# Patient Record
Sex: MALE | Race: WHITE | URBAN - NONMETROPOLITAN AREA
[De-identification: names, ages, dates, MRNs, and addresses within clinical notes are randomized per-mention and may not be internally consistent; named-entity substitution may affect disease eponyms.]

---

## 2017-05-10 ENCOUNTER — DOCTOR'S OFFICE (OUTPATIENT)
Dept: URBAN - NONMETROPOLITAN AREA CLINIC 1 | Facility: CLINIC | Age: 52
Setting detail: OPHTHALMOLOGY
End: 2017-05-10

## 2017-05-10 ENCOUNTER — DOCTOR'S OFFICE (OUTPATIENT)
Dept: URBAN - NONMETROPOLITAN AREA CLINIC 1 | Facility: CLINIC | Age: 52
Setting detail: OPHTHALMOLOGY
End: 2017-05-10
Payer: COMMERCIAL

## 2017-05-10 DIAGNOSIS — H01.001: ICD-10-CM

## 2017-05-10 DIAGNOSIS — H01.005: ICD-10-CM

## 2017-05-10 DIAGNOSIS — H52.03: ICD-10-CM

## 2017-05-10 DIAGNOSIS — H52.4: ICD-10-CM

## 2017-05-10 DIAGNOSIS — H01.004: ICD-10-CM

## 2017-05-10 DIAGNOSIS — H01.002: ICD-10-CM

## 2017-05-10 PROBLEM — H00.1: Status: RESOLVED | Noted: 2017-05-10 | Resolved: 2017-05-10

## 2017-05-10 PROCEDURE — 92015 DETERMINE REFRACTIVE STATE: CPT | Performed by: OPTOMETRIST

## 2017-05-10 PROCEDURE — 92014 COMPRE OPH EXAM EST PT 1/>: CPT | Performed by: OPTOMETRIST

## 2017-05-10 PROCEDURE — AVENOVA FA AVENOVA FACE CLEANSER: Performed by: OPHTHALMOLOGY

## 2017-05-10 ASSESSMENT — REFRACTION_CURRENTRX
OD_OVR_VA: 20/
OS_OVR_VA: 20/

## 2017-05-10 ASSESSMENT — REFRACTION_MANIFEST
OD_VA2: 20/
OS_VA2: 20/
OD_VA2: 20/
OS_VA1: 20/
OU_VA: 20/
OD_VA3: 20/
OD_VA3: 20/
OS_VA2: 20/
OS_VA1: 20/
OU_VA: 20/
OD_VA1: 20/
OS_VA3: 20/
OD_VA1: 20/
OS_VA3: 20/

## 2017-05-10 ASSESSMENT — REFRACTION_AUTOREFRACTION
OS_AXIS: 68
OD_CYLINDER: 0.00
OS_SPHERE: +1.00
OS_CYLINDER: -0.25
OD_SPHERE: +0.50

## 2017-05-10 ASSESSMENT — REFRACTION_OUTSIDERX
OS_ADD: +2.00
OD_AXIS: 135
OS_VA1: 20/20
OS_CYLINDER: -0.25
OD_SPHERE: +0.50
OD_CYLINDER: -0.75
OD_VA3: 20/
OS_VA2: 20/20
OD_VA2: 20/20
OS_AXIS: 075
OU_VA: 20/
OD_VA1: 20/20
OS_VA3: 20/
OD_ADD: +2.00
OS_SPHERE: +0.50

## 2017-05-10 ASSESSMENT — SPHEQUIV_DERIVED
OD_SPHEQUIV: 0.5
OS_SPHEQUIV: 0.875

## 2017-05-10 ASSESSMENT — VISUAL ACUITY
OS_BCVA: 20/20-1
OD_BCVA: 20/20-1

## 2017-05-10 ASSESSMENT — CONFRONTATIONAL VISUAL FIELD TEST (CVF)
OD_FINDINGS: FULL
OS_FINDINGS: FULL

## 2017-05-10 ASSESSMENT — LID EXAM ASSESSMENTS
OS_BLEPHARITIS: LLL LUL 1+ 2+
OD_BLEPHARITIS: RLL RUL 1+ 2+

## 2017-08-04 ENCOUNTER — OPTICAL OFFICE (OUTPATIENT)
Dept: URBAN - NONMETROPOLITAN AREA CLINIC 4 | Facility: CLINIC | Age: 52
Setting detail: OPHTHALMOLOGY
End: 2017-08-04
Payer: COMMERCIAL

## 2017-08-04 DIAGNOSIS — H52.223: ICD-10-CM

## 2017-08-04 PROCEDURE — V2781 PROGRESSIVE LENS PER LENS: HCPCS | Performed by: OPTOMETRIST

## 2017-08-04 PROCEDURE — V2020 VISION SVCS FRAMES PURCHASES: HCPCS | Performed by: OPTOMETRIST

## 2017-08-04 PROCEDURE — V2025 EYEGLASSES DELUX FRAMES: HCPCS | Performed by: OPTOMETRIST

## 2017-08-04 PROCEDURE — V2750 ANTI-REFLECTIVE COATING: HCPCS | Performed by: OPTOMETRIST

## 2017-08-04 PROCEDURE — V2203 LENS SPHCYL BIFOCAL 4.00D/.1: HCPCS | Performed by: OPTOMETRIST

## 2017-10-20 ENCOUNTER — RX ONLY (RX ONLY)
Age: 52
End: 2017-10-20

## 2017-10-20 ENCOUNTER — DOCTOR'S OFFICE (OUTPATIENT)
Dept: URBAN - NONMETROPOLITAN AREA CLINIC 1 | Facility: CLINIC | Age: 52
Setting detail: OPHTHALMOLOGY
End: 2017-10-20
Payer: MEDICARE

## 2017-10-20 ENCOUNTER — DOCTOR'S OFFICE (OUTPATIENT)
Dept: URBAN - NONMETROPOLITAN AREA CLINIC 1 | Facility: CLINIC | Age: 52
Setting detail: OPHTHALMOLOGY
End: 2017-10-20

## 2017-10-20 DIAGNOSIS — H01.004: ICD-10-CM

## 2017-10-20 DIAGNOSIS — H01.005: ICD-10-CM

## 2017-10-20 DIAGNOSIS — H01.002: ICD-10-CM

## 2017-10-20 DIAGNOSIS — H01.001: ICD-10-CM

## 2017-10-20 DIAGNOSIS — H52.4: ICD-10-CM

## 2017-10-20 PROCEDURE — AVENOVA FA AVENOVA FACE CLEANSER: Performed by: OPTOMETRIST

## 2017-10-20 PROCEDURE — 92012 INTRM OPH EXAM EST PATIENT: CPT | Performed by: OPTOMETRIST

## 2017-10-20 ASSESSMENT — CONFRONTATIONAL VISUAL FIELD TEST (CVF)
OD_FINDINGS: FULL
OS_FINDINGS: FULL

## 2017-10-20 ASSESSMENT — REFRACTION_OUTSIDERX
OS_SPHERE: +0.50
OS_VA2: 20/20
OD_VA2: 20/20
OS_VA1: 20/20
OD_VA1: 20/20
OS_AXIS: 075
OD_VA3: 20/
OD_AXIS: 135
OD_ADD: +2.00
OS_VA3: 20/
OS_CYLINDER: -0.25
OD_SPHERE: +0.50
OS_ADD: +2.00
OD_CYLINDER: -0.75
OU_VA: 20/

## 2017-10-20 ASSESSMENT — REFRACTION_MANIFEST
OD_VA1: 20/
OU_VA: 20/
OS_VA3: 20/
OD_VA1: 20/
OU_VA: 20/
OS_VA3: 20/
OD_VA2: 20/
OD_VA3: 20/
OS_VA2: 20/
OS_VA2: 20/
OD_VA2: 20/
OS_VA1: 20/
OS_VA1: 20/
OD_VA3: 20/

## 2017-10-20 ASSESSMENT — SPHEQUIV_DERIVED
OS_SPHEQUIV: 0.875
OD_SPHEQUIV: 0.5

## 2017-10-20 ASSESSMENT — REFRACTION_AUTOREFRACTION
OS_AXIS: 68
OD_CYLINDER: 0.00
OS_SPHERE: +1.00
OD_SPHERE: +0.50
OS_CYLINDER: -0.25

## 2017-10-20 ASSESSMENT — REFRACTION_CURRENTRX
OS_OVR_VA: 20/
OD_OVR_VA: 20/
OS_OVR_VA: 20/
OS_OVR_VA: 20/
OD_OVR_VA: 20/
OD_OVR_VA: 20/

## 2017-10-20 ASSESSMENT — VISUAL ACUITY
OS_BCVA: 20/20-1
OD_BCVA: 20/20-2

## 2017-10-20 ASSESSMENT — LID EXAM ASSESSMENTS
OD_BLEPHARITIS: RLL RUL 1+ 2+
OS_BLEPHARITIS: LLL LUL 1+ 2+

## 2019-01-18 ENCOUNTER — DOCTOR'S OFFICE (OUTPATIENT)
Dept: URBAN - NONMETROPOLITAN AREA CLINIC 1 | Facility: CLINIC | Age: 54
Setting detail: OPHTHALMOLOGY
End: 2019-01-18
Payer: MEDICARE

## 2019-01-18 DIAGNOSIS — H01.001: ICD-10-CM

## 2019-01-18 DIAGNOSIS — L03.213: ICD-10-CM

## 2019-01-18 DIAGNOSIS — H01.005: ICD-10-CM

## 2019-01-18 DIAGNOSIS — H01.002: ICD-10-CM

## 2019-01-18 DIAGNOSIS — H01.004: ICD-10-CM

## 2019-01-18 DIAGNOSIS — H00.11: ICD-10-CM

## 2019-01-18 PROCEDURE — 92012 INTRM OPH EXAM EST PATIENT: CPT | Performed by: OPTOMETRIST

## 2019-01-18 ASSESSMENT — VISUAL ACUITY
OS_BCVA: 20/20-2
OD_BCVA: 20/25+2

## 2019-01-18 ASSESSMENT — REFRACTION_MANIFEST
OD_VA2: 20/
OS_AXIS: 075
OD_VA1: 20/
OS_VA1: 20/
OU_VA: 20/
OS_CYLINDER: -0.25
OS_VA1: 20/20
OD_SPHERE: +0.50
OS_VA3: 20/
OS_SPHERE: +0.50
OD_CYLINDER: -0.75
OS_VA3: 20/
OS_ADD: +2.00
OD_VA1: 20/20
OD_VA2: 20/20
OD_AXIS: 135
OS_VA2: 20/
OD_VA3: 20/
OD_VA3: 20/
OD_ADD: +2.00
OU_VA: 20/
OS_VA2: 20/20

## 2019-01-18 ASSESSMENT — REFRACTION_CURRENTRX
OD_OVR_VA: 20/
OS_OVR_VA: 20/
OS_OVR_VA: 20/
OD_OVR_VA: 20/
OS_OVR_VA: 20/
OD_OVR_VA: 20/

## 2019-01-18 ASSESSMENT — SPHEQUIV_DERIVED
OS_SPHEQUIV: 0.875
OD_SPHEQUIV: 0.5
OD_SPHEQUIV: 0.125
OS_SPHEQUIV: 0.375

## 2019-01-18 ASSESSMENT — CONFRONTATIONAL VISUAL FIELD TEST (CVF)
OS_FINDINGS: FULL
OD_FINDINGS: FULL

## 2019-01-18 ASSESSMENT — REFRACTION_AUTOREFRACTION
OD_CYLINDER: 0.00
OS_AXIS: 68
OS_SPHERE: +1.00
OS_CYLINDER: -0.25
OD_SPHERE: +0.50

## 2019-01-18 ASSESSMENT — LID EXAM ASSESSMENTS
OS_BLEPHARITIS: LLL LUL 1+ 2+
OD_BLEPHARITIS: RLL RUL 1+ 2+

## 2019-07-18 ENCOUNTER — DOCTOR'S OFFICE (OUTPATIENT)
Dept: URBAN - NONMETROPOLITAN AREA CLINIC 1 | Facility: CLINIC | Age: 54
Setting detail: OPHTHALMOLOGY
End: 2019-07-18
Payer: MEDICARE

## 2019-07-18 DIAGNOSIS — H00.11: ICD-10-CM

## 2019-07-18 DIAGNOSIS — L03.213: ICD-10-CM

## 2019-07-18 PROCEDURE — 92012 INTRM OPH EXAM EST PATIENT: CPT | Performed by: OPHTHALMOLOGY

## 2019-07-18 ASSESSMENT — REFRACTION_MANIFEST
OS_VA2: 20/
OD_SPHERE: +0.50
OD_ADD: +2.00
OD_VA3: 20/
OS_VA1: 20/20
OU_VA: 20/
OS_VA3: 20/
OU_VA: 20/
OS_CYLINDER: -0.25
OD_VA3: 20/
OS_ADD: +2.00
OS_AXIS: 075
OD_VA2: 20/20
OD_VA2: 20/
OD_VA1: 20/20
OS_VA2: 20/20
OS_VA3: 20/
OS_VA1: 20/
OD_AXIS: 135
OS_SPHERE: +0.50
OD_CYLINDER: -0.75
OD_VA1: 20/

## 2019-07-18 ASSESSMENT — REFRACTION_CURRENTRX
OD_OVR_VA: 20/
OD_OVR_VA: 20/
OS_OVR_VA: 20/
OD_OVR_VA: 20/

## 2019-07-18 ASSESSMENT — VISUAL ACUITY
OS_BCVA: 20/25-2
OD_BCVA: 20/25+2

## 2019-07-18 ASSESSMENT — REFRACTION_AUTOREFRACTION
OD_SPHERE: +0.50
OD_CYLINDER: 0.00
OS_CYLINDER: -0.25
OS_SPHERE: +1.00
OS_AXIS: 68

## 2019-07-18 ASSESSMENT — SPHEQUIV_DERIVED
OS_SPHEQUIV: 0.375
OD_SPHEQUIV: 0.125
OD_SPHEQUIV: 0.5
OS_SPHEQUIV: 0.875

## 2019-07-18 ASSESSMENT — LID EXAM ASSESSMENTS
OD_BLEPHARITIS: RLL RUL 1+ 2+
OS_BLEPHARITIS: LLL LUL 1+ 2+

## 2019-07-30 ENCOUNTER — DOCTOR'S OFFICE (OUTPATIENT)
Dept: URBAN - NONMETROPOLITAN AREA CLINIC 1 | Facility: CLINIC | Age: 54
Setting detail: OPHTHALMOLOGY
End: 2019-07-30
Payer: COMMERCIAL

## 2019-07-30 DIAGNOSIS — H01.001: ICD-10-CM

## 2019-07-30 DIAGNOSIS — H01.002: ICD-10-CM

## 2019-07-30 DIAGNOSIS — H01.004: ICD-10-CM

## 2019-07-30 DIAGNOSIS — H01.005: ICD-10-CM

## 2019-07-30 DIAGNOSIS — H00.11: ICD-10-CM

## 2019-07-30 PROCEDURE — 99214 OFFICE O/P EST MOD 30 MIN: CPT | Performed by: OPHTHALMOLOGY

## 2019-07-30 ASSESSMENT — LID EXAM ASSESSMENTS
OD_BLEPHARITIS: RLL RUL T
OS_BLEPHARITIS: LLL LUL 1+ 2+

## 2019-07-31 ASSESSMENT — REFRACTION_CURRENTRX
OS_OVR_VA: 20/
OD_OVR_VA: 20/

## 2019-07-31 ASSESSMENT — REFRACTION_AUTOREFRACTION
OD_CYLINDER: 0.00
OD_SPHERE: +0.50
OS_CYLINDER: -0.25
OS_AXIS: 68
OS_SPHERE: +1.00

## 2019-07-31 ASSESSMENT — REFRACTION_MANIFEST
OS_ADD: +2.00
OS_VA2: 20/
OD_VA3: 20/
OD_VA2: 20/20
OD_VA1: 20/20
OU_VA: 20/
OS_VA2: 20/20
OS_SPHERE: +0.50
OD_AXIS: 135
OD_VA3: 20/
OS_VA3: 20/
OD_VA1: 20/
OD_SPHERE: +0.50
OS_VA1: 20/20
OD_VA2: 20/
OS_CYLINDER: -0.25
OD_ADD: +2.00
OS_AXIS: 075
OS_VA3: 20/
OU_VA: 20/
OD_CYLINDER: -0.75
OS_VA1: 20/

## 2019-07-31 ASSESSMENT — SPHEQUIV_DERIVED
OD_SPHEQUIV: 0.5
OS_SPHEQUIV: 0.375
OS_SPHEQUIV: 0.875
OD_SPHEQUIV: 0.125

## 2019-07-31 ASSESSMENT — VISUAL ACUITY
OD_BCVA: 20/20
OS_BCVA: 20/20

## 2019-09-03 ENCOUNTER — OPTICAL OFFICE (OUTPATIENT)
Dept: URBAN - NONMETROPOLITAN AREA CLINIC 4 | Facility: CLINIC | Age: 54
Setting detail: OPHTHALMOLOGY
End: 2019-09-03
Payer: COMMERCIAL

## 2019-09-03 DIAGNOSIS — H52.223: ICD-10-CM

## 2019-09-03 PROCEDURE — V2203 LENS SPHCYL BIFOCAL 4.00D/.1: HCPCS | Performed by: OPHTHALMOLOGY

## 2019-09-03 PROCEDURE — V2781 PROGRESSIVE LENS PER LENS: HCPCS | Performed by: OPHTHALMOLOGY

## 2019-09-03 PROCEDURE — V2025 EYEGLASSES DELUX FRAMES: HCPCS | Performed by: OPHTHALMOLOGY

## 2019-09-03 PROCEDURE — V2020 VISION SVCS FRAMES PURCHASES: HCPCS | Performed by: OPHTHALMOLOGY

## 2020-07-01 ENCOUNTER — OPTICAL OFFICE (OUTPATIENT)
Dept: URBAN - NONMETROPOLITAN AREA CLINIC 4 | Facility: CLINIC | Age: 55
Setting detail: OPHTHALMOLOGY
End: 2020-07-01

## 2020-07-01 DIAGNOSIS — H52.7: ICD-10-CM

## 2020-07-01 PROCEDURE — V2799 MISC VISION ITEM OR SERVICE: HCPCS | Performed by: OPHTHALMOLOGY

## 2021-04-19 ENCOUNTER — HOSPITAL ENCOUNTER (EMERGENCY)
Facility: HOSPITAL | Age: 56
Discharge: HOME | End: 2021-04-19
Attending: EMERGENCY MEDICINE | Admitting: EMERGENCY MEDICINE
Payer: COMMERCIAL

## 2021-04-19 ENCOUNTER — APPOINTMENT (EMERGENCY)
Dept: RADIOLOGY | Facility: HOSPITAL | Age: 56
End: 2021-04-19
Payer: COMMERCIAL

## 2021-04-19 VITALS
OXYGEN SATURATION: 95 % | HEIGHT: 72 IN | WEIGHT: 249.12 LBS | SYSTOLIC BLOOD PRESSURE: 132 MMHG | TEMPERATURE: 98.7 F | HEART RATE: 84 BPM | DIASTOLIC BLOOD PRESSURE: 78 MMHG | BODY MASS INDEX: 33.74 KG/M2 | RESPIRATION RATE: 16 BRPM

## 2021-04-19 DIAGNOSIS — R00.0 TACHYCARDIA: ICD-10-CM

## 2021-04-19 DIAGNOSIS — R07.89 ATYPICAL CHEST PAIN: Primary | ICD-10-CM

## 2021-04-19 LAB
ALBUMIN SERPL BCP-MCNC: 4.1 G/DL (ref 3.5–5)
ALP SERPL-CCNC: 63 U/L (ref 46–116)
ALT SERPL W P-5'-P-CCNC: 32 U/L (ref 12–78)
ANION GAP SERPL CALCULATED.3IONS-SCNC: 9 MMOL/L (ref 4–13)
AST SERPL W P-5'-P-CCNC: 22 U/L (ref 5–45)
BASOPHILS # BLD AUTO: 0.06 THOUSANDS/ΜL (ref 0–0.1)
BASOPHILS NFR BLD AUTO: 1 % (ref 0–1)
BILIRUB SERPL-MCNC: 0.74 MG/DL (ref 0.2–1)
BUN SERPL-MCNC: 10 MG/DL (ref 5–25)
CALCIUM SERPL-MCNC: 8.7 MG/DL (ref 8.3–10.1)
CHLORIDE SERPL-SCNC: 103 MMOL/L (ref 100–108)
CO2 SERPL-SCNC: 28 MMOL/L (ref 21–32)
CREAT SERPL-MCNC: 1.32 MG/DL (ref 0.6–1.3)
EOSINOPHIL # BLD AUTO: 0.28 THOUSAND/ΜL (ref 0–0.61)
EOSINOPHIL NFR BLD AUTO: 3 % (ref 0–6)
ERYTHROCYTE [DISTWIDTH] IN BLOOD BY AUTOMATED COUNT: 12.3 % (ref 11.6–15.1)
GFR SERPL CREATININE-BSD FRML MDRD: 61 ML/MIN/1.73SQ M
GLUCOSE SERPL-MCNC: 91 MG/DL (ref 65–140)
HCT VFR BLD AUTO: 44.2 % (ref 36.5–49.3)
HGB BLD-MCNC: 15.6 G/DL (ref 12–17)
IMM GRANULOCYTES # BLD AUTO: 0.05 THOUSAND/UL (ref 0–0.2)
IMM GRANULOCYTES NFR BLD AUTO: 1 % (ref 0–2)
LYMPHOCYTES # BLD AUTO: 3.96 THOUSANDS/ΜL (ref 0.6–4.47)
LYMPHOCYTES NFR BLD AUTO: 36 % (ref 14–44)
MAGNESIUM SERPL-MCNC: 1.9 MG/DL (ref 1.6–2.6)
MCH RBC QN AUTO: 30.4 PG (ref 26.8–34.3)
MCHC RBC AUTO-ENTMCNC: 35.3 G/DL (ref 31.4–37.4)
MCV RBC AUTO: 86 FL (ref 82–98)
MONOCYTES # BLD AUTO: 0.89 THOUSAND/ΜL (ref 0.17–1.22)
MONOCYTES NFR BLD AUTO: 8 % (ref 4–12)
NEUTROPHILS # BLD AUTO: 5.84 THOUSANDS/ΜL (ref 1.85–7.62)
NEUTS SEG NFR BLD AUTO: 51 % (ref 43–75)
NRBC BLD AUTO-RTO: 0 /100 WBCS
PLATELET # BLD AUTO: 225 THOUSANDS/UL (ref 149–390)
PMV BLD AUTO: 10.3 FL (ref 8.9–12.7)
POTASSIUM SERPL-SCNC: 3.4 MMOL/L (ref 3.5–5.3)
PROT SERPL-MCNC: 7.2 G/DL (ref 6.4–8.2)
RBC # BLD AUTO: 5.13 MILLION/UL (ref 3.88–5.62)
SODIUM SERPL-SCNC: 140 MMOL/L (ref 136–145)
TROPONIN I SERPL-MCNC: <0.02 NG/ML
TSH SERPL DL<=0.05 MIU/L-ACNC: 3.76 UIU/ML (ref 0.36–3.74)
WBC # BLD AUTO: 11.08 THOUSAND/UL (ref 4.31–10.16)

## 2021-04-19 PROCEDURE — 71045 X-RAY EXAM CHEST 1 VIEW: CPT

## 2021-04-19 PROCEDURE — 93005 ELECTROCARDIOGRAM TRACING: CPT

## 2021-04-19 PROCEDURE — 85025 COMPLETE CBC W/AUTO DIFF WBC: CPT | Performed by: EMERGENCY MEDICINE

## 2021-04-19 PROCEDURE — 99284 EMERGENCY DEPT VISIT MOD MDM: CPT

## 2021-04-19 PROCEDURE — 83735 ASSAY OF MAGNESIUM: CPT | Performed by: EMERGENCY MEDICINE

## 2021-04-19 PROCEDURE — 96360 HYDRATION IV INFUSION INIT: CPT

## 2021-04-19 PROCEDURE — 80053 COMPREHEN METABOLIC PANEL: CPT | Performed by: EMERGENCY MEDICINE

## 2021-04-19 PROCEDURE — 36415 COLL VENOUS BLD VENIPUNCTURE: CPT | Performed by: EMERGENCY MEDICINE

## 2021-04-19 PROCEDURE — 84484 ASSAY OF TROPONIN QUANT: CPT | Performed by: EMERGENCY MEDICINE

## 2021-04-19 PROCEDURE — 84443 ASSAY THYROID STIM HORMONE: CPT | Performed by: EMERGENCY MEDICINE

## 2021-04-19 PROCEDURE — 99285 EMERGENCY DEPT VISIT HI MDM: CPT | Performed by: EMERGENCY MEDICINE

## 2021-04-19 RX ORDER — PANTOPRAZOLE SODIUM 40 MG/1
40 TABLET, DELAYED RELEASE ORAL
COMMUNITY
Start: 2020-11-23

## 2021-04-19 RX ORDER — POTASSIUM CHLORIDE 20 MEQ/1
40 TABLET, EXTENDED RELEASE ORAL ONCE
Status: COMPLETED | OUTPATIENT
Start: 2021-04-19 | End: 2021-04-19

## 2021-04-19 RX ADMIN — POTASSIUM CHLORIDE 40 MEQ: 1500 TABLET, EXTENDED RELEASE ORAL at 21:17

## 2021-04-19 RX ADMIN — SODIUM CHLORIDE 1000 ML: 0.9 INJECTION, SOLUTION INTRAVENOUS at 20:26

## 2021-04-20 NOTE — DISCHARGE INSTRUCTIONS
Return immediately if worse or new symptoms  Maintain good hydration  Please call your physician of form of visit follow-up  Call cardiologist through follow-up as well

## 2021-04-20 NOTE — ED PROVIDER NOTES
History  Chief Complaint   Patient presents with    Rib Pain     c/o left sided rib pain after putting together a treadmill today  pt states at home he felt his heart rate was "high" also  WNL upon arrival      50year old male with spouse complains of brief mild left anterior inferior chest wall discomfort and then palpitations, used pulse oximeter and heart rate 160s, laid down but persisted, no other symptoms  Also, checked radial pulse and too fast to count  Currently, feels well  No exercise intolerance  Regular coffee 16 oz daily     Denies CAD, CVA and VTE  FH mother poor health DM smoker CAD younger, cancer  PMH GERD      History provided by:  Patient  Palpitations  Palpitations quality:  Regular  Onset quality:  Sudden  Timing:  Constant  Progression:  Resolved  Chronicity:  New  Context: caffeine    Associated symptoms: chest pain    Associated symptoms: no back pain, no diaphoresis and no shortness of breath    Risk factors: no hx of atrial fibrillation        Prior to Admission Medications   Prescriptions Last Dose Informant Patient Reported? Taking? pantoprazole (PROTONIX) 40 mg tablet   Yes Yes   Sig: Take 40 mg by mouth      Facility-Administered Medications: None       Past Medical History:   Diagnosis Date    GERD (gastroesophageal reflux disease)        Past Surgical History:   Procedure Laterality Date    APPENDECTOMY      ARTHROSCOPIC REPAIR ACL Right     BACK SURGERY      CYST REMOVAL      from testicle     SHOULDER SURGERY Left     ULNAR NERVE REPAIR Left        History reviewed  No pertinent family history  I have reviewed and agree with the history as documented  E-Cigarette/Vaping     E-Cigarette/Vaping Substances     Social History     Tobacco Use    Smoking status: Never Smoker    Smokeless tobacco: Never Used   Substance Use Topics    Alcohol use: Yes     Frequency: Monthly or less    Drug use: Never       Review of Systems   Constitutional: Negative for diaphoresis  Respiratory: Negative for shortness of breath  Cardiovascular: Positive for chest pain  Musculoskeletal: Negative for back pain  All other systems reviewed and are negative  Physical Exam  Physical Exam  Vitals signs and nursing note reviewed  Constitutional:       Comments: Pleasant, comfortable-appearing   HENT:      Head: Normocephalic and atraumatic  Eyes:      Conjunctiva/sclera: Conjunctivae normal       Pupils: Pupils are equal, round, and reactive to light  Neck:      Musculoskeletal: Neck supple  Cardiovascular:      Rate and Rhythm: Normal rate and regular rhythm  Heart sounds: Normal heart sounds  Pulmonary:      Effort: Pulmonary effort is normal       Breath sounds: Normal breath sounds  Abdominal:      General: Bowel sounds are normal  There is no distension  Palpations: Abdomen is soft  Tenderness: There is no abdominal tenderness  Musculoskeletal: Normal range of motion  Skin:     General: Skin is warm and dry  Neurological:      Mental Status: He is alert and oriented to person, place, and time  Cranial Nerves: No cranial nerve deficit  Coordination: Coordination normal    Psychiatric:         Behavior: Behavior normal          Thought Content:  Thought content normal          Judgment: Judgment normal          Vital Signs  ED Triage Vitals [04/19/21 2005]   Temperature Pulse Respirations Blood Pressure SpO2   98 7 °F (37 1 °C) 84 16 139/90 95 %      Temp Source Heart Rate Source Patient Position - Orthostatic VS BP Location FiO2 (%)   Temporal Monitor Sitting Right arm --      Pain Score       3           Vitals:    04/19/21 2005   BP: 139/90   Pulse: 84   Patient Position - Orthostatic VS: Sitting         Visual Acuity      ED Medications  Medications   sodium chloride 0 9 % bolus 1,000 mL (0 mL Intravenous Stopped 4/19/21 2130)   potassium chloride (K-DUR,KLOR-CON) CR tablet 40 mEq (40 mEq Oral Given 4/19/21 2117)       Diagnostic Studies  Results Reviewed     Procedure Component Value Units Date/Time    TSH [[id number]]  (Abnormal) Collected: 04/19/21 2026    Lab Status: Final result Specimen: Blood from Arm, Right Updated: 04/19/21 2100     TSH 3RD GENERATON 3 760 uIU/mL     Narrative:      Patients undergoing fluorescein dye angiography may retain small amounts of fluorescein in the body for 48-72 hours post procedure  Samples containing fluorescein can produce falsely depressed TSH values  If the patient had this procedure,a specimen should be resubmitted post fluorescein clearance        Troponin I [[id number]]  (Normal) Collected: 04/19/21 2026    Lab Status: Final result Specimen: Blood from Arm, Right Updated: 04/19/21 2052     Troponin I <0 02 ng/mL     Comprehensive metabolic panel [[id number]]  (Abnormal) Collected: 04/19/21 2026    Lab Status: Final result Specimen: Blood from Arm, Right Updated: 04/19/21 2049     Sodium 140 mmol/L      Potassium 3 4 mmol/L      Chloride 103 mmol/L      CO2 28 mmol/L      ANION GAP 9 mmol/L      BUN 10 mg/dL      Creatinine 1 32 mg/dL      Glucose 91 mg/dL      Calcium 8 7 mg/dL      AST 22 U/L      ALT 32 U/L      Alkaline Phosphatase 63 U/L      Total Protein 7 2 g/dL      Albumin 4 1 g/dL      Total Bilirubin 0 74 mg/dL      eGFR 61 ml/min/1 73sq m     Narrative:      [place] guidelines for Chronic Kidney Disease (CKD):     Stage 1 with normal or high GFR (GFR > 90 mL/min/1 73 square meters)    Stage 2 Mild CKD (GFR = 60-89 mL/min/1 73 square meters)    Stage 3A Moderate CKD (GFR = 45-59 mL/min/1 73 square meters)    Stage 3B Moderate CKD (GFR = 30-44 mL/min/1 73 square meters)    Stage 4 Severe CKD (GFR = 15-29 mL/min/1 73 square meters)    Stage 5 End Stage CKD (GFR <15 mL/min/1 73 square meters)  Note: GFR calculation is accurate only with a steady state creatinine    Magnesium [[id number]]  (Normal) Collected: 04/19/21 2026    Lab Status: Final result Specimen: Blood from Arm, Right Updated: 04/19/21 2049     Magnesium 1 9 mg/dL     CBC and differential [[id number]]  (Abnormal) Collected: 04/19/21 2026    Lab Status: Final result Specimen: Blood from Arm, Right Updated: 04/19/21 2033     WBC 11 08 Thousand/uL      RBC 5 13 Million/uL      Hemoglobin 15 6 g/dL      Hematocrit 44 2 %      MCV 86 fL      MCH 30 4 pg      MCHC 35 3 g/dL      RDW 12 3 %      MPV 10 3 fL      Platelets 392 Thousands/uL      nRBC 0 /100 WBCs      Neutrophils Relative 51 %      Immat GRANS % 1 %      Lymphocytes Relative 36 %      Monocytes Relative 8 %      Eosinophils Relative 3 %      Basophils Relative 1 %      Neutrophils Absolute 5 84 Thousands/µL      Immature Grans Absolute 0 05 Thousand/uL      Lymphocytes Absolute 3 96 Thousands/µL      Monocytes Absolute 0 89 Thousand/µL      Eosinophils Absolute 0 28 Thousand/µL      Basophils Absolute 0 06 Thousands/µL                  XR chest 1 view portable   ED Interpretation by Anna Boston DO (04/19 2112)   NAD                 Procedures  Procedures         ED Course  ED Course as of Apr 19 2133 Mon Apr 19, 2021 2046 EKG 2028 normal sinus rhythm rate 82 left axis normal intervals T-wave inversion V1, lead 3 no ST elevation or depression interpreted by me      2111 TSH 3RD Lalo Do(!): 3 760   2112 Magnesium: 1 9   2112 Potassium(!): 3 4   2112 Troponin I: <0 02   2130 Currently feels well, agreeable close outpatient follow-up                                              MDM    Disposition  Final diagnoses:   Atypical chest pain   Tachycardia     Time reflects when diagnosis was documented in both MDM as applicable and the Disposition within this note     Time User Action Codes Description Comment    4/19/2021  9:31 PM Myrle Screws Add [R07 89] Atypical chest pain     4/19/2021  9:31 PM Myrle Screws Add [R00 0] Tachycardia       ED Disposition     ED Disposition Condition Date/Time Comment    Discharge Stable Mon Apr 19, 2021 9:31 PM Jennifer Anderson  discharge to home/self care              Follow-up Information     Follow up With Specialties Details Why BETY Hastings Nurse Practitioner Schedule an appointment as soon as possible for a visit in 1 week  [address]      Christ Hill MD Internal Medicine, Cardiology   [address] [place] [address] [address] [facility]  [phone]            Patient's Medications   Discharge Prescriptions    No medications on file         PDMP Review     None          ED Provider  Electronically Signed by           Veronica Modi DO  04/19/21 2893

## 2021-04-21 LAB
ATRIAL RATE: 82 BPM
P AXIS: 60 DEGREES
PR INTERVAL: 190 MS
QRS AXIS: -38 DEGREES
QRSD INTERVAL: 88 MS
QT INTERVAL: 370 MS
QTC INTERVAL: 432 MS
T WAVE AXIS: 26 DEGREES
VENTRICULAR RATE: 82 BPM

## 2021-05-19 DIAGNOSIS — R00.2 PALPITATIONS: ICD-10-CM

## 2021-05-26 ENCOUNTER — HOSPITAL ENCOUNTER (OUTPATIENT)
Dept: NON INVASIVE DIAGNOSTICS | Facility: HOSPITAL | Age: 56
Discharge: HOME | End: 2021-05-26
Payer: COMMERCIAL

## 2021-05-26 DIAGNOSIS — R00.2 PALPITATIONS: ICD-10-CM

## 2021-05-26 PROCEDURE — 93306 TTE W/DOPPLER COMPLETE: CPT | Performed by: INTERNAL MEDICINE

## 2021-05-26 PROCEDURE — 93306 TTE W/DOPPLER COMPLETE: CPT

## 2021-09-07 ENCOUNTER — OPTICAL OFFICE (OUTPATIENT)
Dept: URBAN - NONMETROPOLITAN AREA CLINIC 4 | Facility: CLINIC | Age: 56
Setting detail: OPHTHALMOLOGY
End: 2021-09-07
Payer: COMMERCIAL

## 2021-09-07 ENCOUNTER — DOCTOR'S OFFICE (OUTPATIENT)
Dept: URBAN - NONMETROPOLITAN AREA CLINIC 1 | Facility: CLINIC | Age: 56
Setting detail: OPHTHALMOLOGY
End: 2021-09-07
Payer: COMMERCIAL

## 2021-09-07 DIAGNOSIS — H04.123: ICD-10-CM

## 2021-09-07 DIAGNOSIS — H52.4: ICD-10-CM

## 2021-09-07 DIAGNOSIS — H52.03: ICD-10-CM

## 2021-09-07 DIAGNOSIS — H52.01: ICD-10-CM

## 2021-09-07 DIAGNOSIS — H52.223: ICD-10-CM

## 2021-09-07 DIAGNOSIS — H52.02: ICD-10-CM

## 2021-09-07 PROBLEM — H01.005 BLEPHARITIS; RIGHT UPPER LID, RIGHT LOWER LID, LEFT UPPER LID, LEFT LOWER LID: Status: RESOLVED | Noted: 2017-05-10 | Resolved: 2021-09-07

## 2021-09-07 PROBLEM — L03.213 PERIORBITAL CELLULITIS: Status: RESOLVED | Noted: 2019-01-18 | Resolved: 2021-09-07

## 2021-09-07 PROBLEM — H01.001 BLEPHARITIS; RIGHT UPPER LID, RIGHT LOWER LID, LEFT UPPER LID, LEFT LOWER LID: Status: RESOLVED | Noted: 2017-05-10 | Resolved: 2021-09-07

## 2021-09-07 PROBLEM — H01.002 BLEPHARITIS; RIGHT UPPER LID, RIGHT LOWER LID, LEFT UPPER LID, LEFT LOWER LID: Status: RESOLVED | Noted: 2017-05-10 | Resolved: 2021-09-07

## 2021-09-07 PROBLEM — H01.004 BLEPHARITIS; RIGHT UPPER LID, RIGHT LOWER LID, LEFT UPPER LID, LEFT LOWER LID: Status: RESOLVED | Noted: 2017-05-10 | Resolved: 2021-09-07

## 2021-09-07 PROBLEM — H00.11 CHALAZION; RIGHT UPPER LID: Status: RESOLVED | Noted: 2019-01-18 | Resolved: 2021-09-07

## 2021-09-07 PROCEDURE — V2025 EYEGLASSES DELUX FRAMES: HCPCS | Performed by: OPTOMETRIST

## 2021-09-07 PROCEDURE — V2781 PROGRESSIVE LENS PER LENS: HCPCS | Performed by: OPTOMETRIST

## 2021-09-07 PROCEDURE — 92014 COMPRE OPH EXAM EST PT 1/>: CPT | Performed by: OPTOMETRIST

## 2021-09-07 PROCEDURE — V2750 ANTI-REFLECTIVE COATING: HCPCS | Performed by: OPTOMETRIST

## 2021-09-07 PROCEDURE — V2203 LENS SPHCYL BIFOCAL 4.00D/.1: HCPCS | Performed by: OPTOMETRIST

## 2021-09-07 PROCEDURE — V2784 LENS POLYCARB OR EQUAL: HCPCS | Performed by: OPTOMETRIST

## 2021-09-07 PROCEDURE — V2799 MISC VISION ITEM OR SERVICE: HCPCS | Performed by: OPTOMETRIST

## 2021-09-07 PROCEDURE — V2020 VISION SVCS FRAMES PURCHASES: HCPCS | Performed by: OPTOMETRIST

## 2021-09-07 ASSESSMENT — REFRACTION_CURRENTRX
OD_SPHERE: +2.00
OD_CYLINDER: -0.50
OS_SPHERE: +2.25
OS_AXIS: 108
OD_OVR_VA: 20/
OS_OVR_VA: 20/
OD_AXIS: 077
OS_CYLINDER: -0.50
OD_VPRISM_DIRECTION: SV
OS_VPRISM_DIRECTION: SV

## 2021-09-07 ASSESSMENT — REFRACTION_MANIFEST
OD_ADD: +2.25
OD_AXIS: 110
OS_SPHERE: +0.75
OS_CYLINDER: -0.50
OD_CYLINDER: -0.75
OS_VA1: 20/20
OD_VA2: 20/20
OS_AXIS: 075
OD_SPHERE: +0.75
OS_VA2: 20/20
OS_ADD: +2.25
OD_VA1: 20/20

## 2021-09-07 ASSESSMENT — TONOMETRY
OD_IOP_MMHG: 12
OS_IOP_MMHG: 13

## 2021-09-07 ASSESSMENT — CONFRONTATIONAL VISUAL FIELD TEST (CVF)
OD_FINDINGS: FULL
OS_FINDINGS: FULL

## 2021-09-07 ASSESSMENT — VISUAL ACUITY
OS_BCVA: 20/20
OD_BCVA: 20/20

## 2021-09-07 ASSESSMENT — REFRACTION_AUTOREFRACTION
OD_AXIS: 105
OD_CYLINDER: -1.50
OS_CYLINDER: -0.75
OD_SPHERE: +1.25
OS_SPHERE: +0.75
OS_AXIS: 082

## 2021-09-07 ASSESSMENT — LID EXAM ASSESSMENTS
OS_BLEPHARITIS: LLL LUL T
OD_BLEPHARITIS: RLL RUL T

## 2021-09-07 ASSESSMENT — SPHEQUIV_DERIVED
OS_SPHEQUIV: 0.375
OD_SPHEQUIV: 0.375
OS_SPHEQUIV: 0.5
OD_SPHEQUIV: 0.5

## 2021-09-07 ASSESSMENT — TEAR BREAK UP TIME (TBUT)
OS_TBUT: 7-8 SEC
OD_TBUT: 7-8 SEC

## 2021-09-25 ENCOUNTER — HOSPITAL ENCOUNTER (EMERGENCY)
Facility: HOSPITAL | Age: 56
Discharge: HOME | End: 2021-09-25
Attending: EMERGENCY MEDICINE | Admitting: EMERGENCY MEDICINE
Payer: COMMERCIAL

## 2021-09-25 VITALS
TEMPERATURE: 98 F | HEIGHT: 72 IN | BODY MASS INDEX: 32.51 KG/M2 | SYSTOLIC BLOOD PRESSURE: 118 MMHG | WEIGHT: 240 LBS | OXYGEN SATURATION: 98 % | DIASTOLIC BLOOD PRESSURE: 78 MMHG | RESPIRATION RATE: 16 BRPM | HEART RATE: 64 BPM

## 2021-09-25 DIAGNOSIS — R00.2 PALPITATIONS: ICD-10-CM

## 2021-09-25 DIAGNOSIS — I49.1 PAC (PREMATURE ATRIAL CONTRACTION): ICD-10-CM

## 2021-09-25 DIAGNOSIS — R07.89 ATYPICAL CHEST PAIN: Primary | ICD-10-CM

## 2021-09-25 LAB
ALBUMIN SERPL BCP-MCNC: 4.2 G/DL (ref 3.5–5)
ALP SERPL-CCNC: 58 U/L (ref 46–116)
ALT SERPL W P-5'-P-CCNC: 26 U/L (ref 12–78)
ANION GAP SERPL CALCULATED.3IONS-SCNC: 10 MMOL/L (ref 4–13)
AST SERPL W P-5'-P-CCNC: 20 U/L (ref 5–45)
ATRIAL RATE: 64 BPM
BASOPHILS # BLD AUTO: 0.07 THOUSANDS/ΜL (ref 0–0.1)
BASOPHILS NFR BLD AUTO: 1 % (ref 0–1)
BILIRUB SERPL-MCNC: 1.01 MG/DL (ref 0.2–1)
BUN SERPL-MCNC: 11 MG/DL (ref 5–25)
CALCIUM SERPL-MCNC: 8.8 MG/DL (ref 8.3–10.1)
CHLORIDE SERPL-SCNC: 102 MMOL/L (ref 100–108)
CO2 SERPL-SCNC: 28 MMOL/L (ref 21–32)
CREAT SERPL-MCNC: 1.2 MG/DL (ref 0.6–1.3)
EOSINOPHIL # BLD AUTO: 0.18 THOUSAND/ΜL (ref 0–0.61)
EOSINOPHIL NFR BLD AUTO: 2 % (ref 0–6)
ERYTHROCYTE [DISTWIDTH] IN BLOOD BY AUTOMATED COUNT: 12.4 % (ref 11.6–15.1)
GFR SERPL CREATININE-BSD FRML MDRD: 68 ML/MIN/1.73SQ M
GLUCOSE SERPL-MCNC: 94 MG/DL (ref 65–140)
HCT VFR BLD AUTO: 46 % (ref 36.5–49.3)
HGB BLD-MCNC: 16.2 G/DL (ref 12–17)
IMM GRANULOCYTES # BLD AUTO: 0.02 THOUSAND/UL (ref 0–0.2)
IMM GRANULOCYTES NFR BLD AUTO: 0 % (ref 0–2)
LYMPHOCYTES # BLD AUTO: 2.86 THOUSANDS/ΜL (ref 0.6–4.47)
LYMPHOCYTES NFR BLD AUTO: 30 % (ref 14–44)
MCH RBC QN AUTO: 30.3 PG (ref 26.8–34.3)
MCHC RBC AUTO-ENTMCNC: 35.2 G/DL (ref 31.4–37.4)
MCV RBC AUTO: 86 FL (ref 82–98)
MONOCYTES # BLD AUTO: 0.86 THOUSAND/ΜL (ref 0.17–1.22)
MONOCYTES NFR BLD AUTO: 9 % (ref 4–12)
NEUTROPHILS # BLD AUTO: 5.68 THOUSANDS/ΜL (ref 1.85–7.62)
NEUTS SEG NFR BLD AUTO: 58 % (ref 43–75)
NRBC BLD AUTO-RTO: 0 /100 WBCS
P AXIS: 57 DEGREES
PLATELET # BLD AUTO: 243 THOUSANDS/UL (ref 149–390)
PMV BLD AUTO: 10.8 FL (ref 8.9–12.7)
POTASSIUM SERPL-SCNC: 3.6 MMOL/L (ref 3.5–5.3)
PR INTERVAL: 180 MS
PROT SERPL-MCNC: 7.2 G/DL (ref 6.4–8.2)
QRS AXIS: 1 DEGREES
QRSD INTERVAL: 94 MS
QT INTERVAL: 416 MS
QTC INTERVAL: 468 MS
RBC # BLD AUTO: 5.34 MILLION/UL (ref 3.88–5.62)
SODIUM SERPL-SCNC: 140 MMOL/L (ref 136–145)
T WAVE AXIS: 12 DEGREES
TROPONIN I SERPL-MCNC: <0.02 NG/ML
TSH SERPL DL<=0.05 MIU/L-ACNC: 2.5 UIU/ML (ref 0.36–3.74)
VENTRICULAR RATE: 76 BPM
WBC # BLD AUTO: 9.67 THOUSAND/UL (ref 4.31–10.16)

## 2021-09-25 PROCEDURE — 84443 ASSAY THYROID STIM HORMONE: CPT | Performed by: EMERGENCY MEDICINE

## 2021-09-25 PROCEDURE — 85025 COMPLETE CBC W/AUTO DIFF WBC: CPT | Performed by: EMERGENCY MEDICINE

## 2021-09-25 PROCEDURE — 99285 EMERGENCY DEPT VISIT HI MDM: CPT

## 2021-09-25 PROCEDURE — 93005 ELECTROCARDIOGRAM TRACING: CPT

## 2021-09-25 PROCEDURE — 84484 ASSAY OF TROPONIN QUANT: CPT | Performed by: EMERGENCY MEDICINE

## 2021-09-25 PROCEDURE — 80053 COMPREHEN METABOLIC PANEL: CPT | Performed by: EMERGENCY MEDICINE

## 2021-09-25 PROCEDURE — 36415 COLL VENOUS BLD VENIPUNCTURE: CPT | Performed by: EMERGENCY MEDICINE

## 2021-09-25 PROCEDURE — 99285 EMERGENCY DEPT VISIT HI MDM: CPT | Performed by: EMERGENCY MEDICINE

## 2023-04-14 PROBLEM — I48.0 PAROXYSMAL A-FIB (HCC): Status: ACTIVE | Noted: 2023-04-14

## 2023-04-14 PROBLEM — R29.90 STROKE-LIKE SYMPTOMS: Status: ACTIVE | Noted: 2023-04-14

## 2023-04-14 PROBLEM — R51.9 HEADACHE: Status: ACTIVE | Noted: 2023-04-14

## 2023-04-15 PROBLEM — R51.9 HEADACHE: Status: RESOLVED | Noted: 2023-04-14 | Resolved: 2023-04-15

## 2023-05-31 ENCOUNTER — APPOINTMENT (EMERGENCY)
Dept: RADIOLOGY | Facility: HOSPITAL | Age: 58
End: 2023-05-31

## 2023-05-31 ENCOUNTER — HOSPITAL ENCOUNTER (EMERGENCY)
Facility: HOSPITAL | Age: 58
Discharge: HOME | End: 2023-05-31
Attending: EMERGENCY MEDICINE

## 2023-05-31 VITALS
RESPIRATION RATE: 19 BRPM | HEART RATE: 54 BPM | OXYGEN SATURATION: 96 % | DIASTOLIC BLOOD PRESSURE: 68 MMHG | SYSTOLIC BLOOD PRESSURE: 109 MMHG | TEMPERATURE: 98 F

## 2023-05-31 DIAGNOSIS — R00.2 PALPITATIONS: Primary | ICD-10-CM

## 2023-05-31 LAB
2HR DELTA HS TROPONIN: -1 NG/L
ALBUMIN SERPL BCP-MCNC: 4.6 G/DL (ref 3.5–5)
ALP SERPL-CCNC: 51 U/L (ref 34–104)
ALT SERPL W P-5'-P-CCNC: 15 U/L (ref 7–52)
ANION GAP SERPL CALCULATED.3IONS-SCNC: 8 MMOL/L (ref 4–13)
APTT PPP: 28 SECONDS (ref 23–37)
AST SERPL W P-5'-P-CCNC: 16 U/L (ref 13–39)
BASOPHILS # BLD AUTO: 0.07 THOUSANDS/ÂΜL (ref 0–0.1)
BASOPHILS NFR BLD AUTO: 1 % (ref 0–1)
BILIRUB SERPL-MCNC: 0.94 MG/DL (ref 0.2–1)
BILIRUB UR QL STRIP: NEGATIVE
BUN SERPL-MCNC: 9 MG/DL (ref 5–25)
CALCIUM SERPL-MCNC: 9.1 MG/DL (ref 8.4–10.2)
CARDIAC TROPONIN I PNL SERPL HS: 5 NG/L
CARDIAC TROPONIN I PNL SERPL HS: 6 NG/L
CHLORIDE SERPL-SCNC: 105 MMOL/L (ref 96–108)
CLARITY UR: CLEAR
CO2 SERPL-SCNC: 25 MMOL/L (ref 21–32)
COLOR UR: YELLOW
CREAT SERPL-MCNC: 1.04 MG/DL (ref 0.6–1.3)
EOSINOPHIL # BLD AUTO: 0.11 THOUSAND/ÂΜL (ref 0–0.61)
EOSINOPHIL NFR BLD AUTO: 1 % (ref 0–6)
ERYTHROCYTE [DISTWIDTH] IN BLOOD BY AUTOMATED COUNT: 12.4 % (ref 11.6–15.1)
GFR SERPL CREATININE-BSD FRML MDRD: 80 ML/MIN/1.73SQ M
GLUCOSE SERPL-MCNC: 111 MG/DL (ref 65–140)
GLUCOSE UR STRIP-MCNC: NEGATIVE MG/DL
HCT VFR BLD AUTO: 47.5 % (ref 36.5–49.3)
HGB BLD-MCNC: 16.4 G/DL (ref 12–17)
HGB UR QL STRIP.AUTO: NEGATIVE
IMM GRANULOCYTES # BLD AUTO: 0.03 THOUSAND/UL (ref 0–0.2)
IMM GRANULOCYTES NFR BLD AUTO: 0 % (ref 0–2)
INR PPP: 0.92 (ref 0.84–1.19)
KETONES UR STRIP-MCNC: NEGATIVE MG/DL
LACTATE SERPL-SCNC: 1.4 MMOL/L (ref 0.5–2)
LEUKOCYTE ESTERASE UR QL STRIP: NEGATIVE
LYMPHOCYTES # BLD AUTO: 2.47 THOUSANDS/ÂΜL (ref 0.6–4.47)
LYMPHOCYTES NFR BLD AUTO: 29 % (ref 14–44)
MAGNESIUM SERPL-MCNC: 2 MG/DL (ref 1.9–2.7)
MCH RBC QN AUTO: 30.1 PG (ref 26.8–34.3)
MCHC RBC AUTO-ENTMCNC: 34.5 G/DL (ref 31.4–37.4)
MCV RBC AUTO: 87 FL (ref 82–98)
MONOCYTES # BLD AUTO: 0.57 THOUSAND/ÂΜL (ref 0.17–1.22)
MONOCYTES NFR BLD AUTO: 7 % (ref 4–12)
NEUTROPHILS # BLD AUTO: 5.39 THOUSANDS/ÂΜL (ref 1.85–7.62)
NEUTS SEG NFR BLD AUTO: 62 % (ref 43–75)
NITRITE UR QL STRIP: NEGATIVE
NRBC BLD AUTO-RTO: 0 /100 WBCS
PH UR STRIP.AUTO: 5.5 [PH]
PLATELET # BLD AUTO: 230 THOUSANDS/UL (ref 149–390)
PMV BLD AUTO: 10.6 FL (ref 8.9–12.7)
POTASSIUM SERPL-SCNC: 4.1 MMOL/L (ref 3.5–5.3)
PROT SERPL-MCNC: 7.1 G/DL (ref 6.4–8.4)
PROT UR STRIP-MCNC: NEGATIVE MG/DL
PROTHROMBIN TIME: 12.5 SECONDS (ref 11.6–14.5)
RBC # BLD AUTO: 5.45 MILLION/UL (ref 3.88–5.62)
SODIUM SERPL-SCNC: 138 MMOL/L (ref 135–147)
SP GR UR STRIP.AUTO: <=1.005 (ref 1–1.03)
TSH SERPL DL<=0.05 MIU/L-ACNC: 1.43 UIU/ML (ref 0.45–4.5)
UROBILINOGEN UR QL STRIP.AUTO: 0.2 E.U./DL
WBC # BLD AUTO: 8.64 THOUSAND/UL (ref 4.31–10.16)

## 2023-05-31 RX ADMIN — SODIUM CHLORIDE 1000 ML: 0.9 INJECTION, SOLUTION INTRAVENOUS at 14:39

## 2023-05-31 NOTE — Clinical Note
Michelle Xiao was seen and treated in our emergency department on 5/31/2023  Diagnosis:     Shraddha Mares  is off the rest of the shift today, may return to work on return date  He may return on this date: 06/05/2023         If you have any questions or concerns, please don't hesitate to call        Mateus Ochoa PA-C    ______________________________           _______________          _______________  Hospital Representative                              Date                                Time

## 2023-05-31 NOTE — DISCHARGE INSTRUCTIONS
Decrease your caffeine use  Increase oral hydration with water  Please call and follow-up with your cardiologist as instructed  He did have bouts of A-fib but did convert into sinus rhythm

## 2023-05-31 NOTE — ED PROVIDER NOTES
History  Chief Complaint   Patient presents with   • Palpitations     Pt reports palpitations today at work  Hx cardiac ablation in April 2023  Patient is a 82-year-old male with a past medical history of SVT, A-fib, presents emerged department today with a chief complaint of palpitations  The patient states that over the weekend while mowing grass outside he had his Apple Watch going off stating that he was in A-fib  Patient did have an ablation approximately a month ago  Follows with cardiology through [facility]  Patient states that he does  state that he feels like a skipped beat and some heart racing  The patient states that he is not having any shortness of breath, dizziness, pain, nausea vomiting abdominal pain diarrhea recent medication adjustments, falls or traumas, headache blurred vision  The patient had an ablation performed for A-fib  Has had SVT in the past   States that he has started a new job and is not drinking as much water but drinking more coffee now  Is on Eliquis twice daily 5 mg  It is asymptomatic upon arrival   EKG in route was concerning for A-fib at rate of 84  To be going between A-fib and sinus rhythm  Palpitations  Palpitations quality:  Fast  Onset quality:  Unable to specify  Timing:  Unable to specify  Progression:  Worsening  Chronicity:  New  Context: caffeine and dehydration    Relieved by:  Nothing  Worsened by:  Nothing  Ineffective treatments:  None tried  Associated symptoms: no chest pain, no cough, no dizziness, no lower extremity edema, no malaise/fatigue, no nausea, no PND and no shortness of breath    Risk factors: hx of atrial fibrillation        Prior to Admission Medications   Prescriptions Last Dose Informant Patient Reported? Taking?    apixaban (Eliquis) 5 mg   Yes No   Sig: Take 5 mg by mouth 2 (two) times a day   pantoprazole (PROTONIX) 40 mg tablet   Yes No   Sig: Take 40 mg by mouth daily   sucralfate (CARAFATE) 1 g tablet   Yes No   Sig: Take 1 g by mouth 3 (three) times a day      Facility-Administered Medications: None       Past Medical History:   Diagnosis Date   • GERD (gastroesophageal reflux disease)    • GERD (gastroesophageal reflux disease)    • Heart beat abnormality        Past Surgical History:   Procedure Laterality Date   • APPENDECTOMY     • ARTHROSCOPIC REPAIR ACL Right    • BACK SURGERY     • CYST REMOVAL      from testicle    • SHOULDER SURGERY Left    • ULNAR NERVE REPAIR Left        Family History   Problem Relation Age of Onset   • Coronary artery disease Mother    • Cancer Mother      I have reviewed and agree with the history as documented  E-Cigarette/Vaping     E-Cigarette/Vaping Substances     Social History     Tobacco Use   • Smoking status: Never   • Smokeless tobacco: Never   Substance Use Topics   • Alcohol use: Yes   • Drug use: Never       Review of Systems   Constitutional: Negative for malaise/fatigue  Respiratory: Negative for cough and shortness of breath  Cardiovascular: Negative for chest pain and PND  Gastrointestinal: Negative for nausea  Neurological: Negative for dizziness  Physical Exam  Physical Exam  Vitals and nursing note reviewed  Constitutional:       General: He is not in acute distress  Appearance: He is well-developed  HENT:      Head: Normocephalic and atraumatic  Eyes:      Extraocular Movements: Extraocular movements intact  Pupils: Pupils are equal, round, and reactive to light  Cardiovascular:      Rate and Rhythm: Normal rate and regular rhythm  Heart sounds: No murmur heard  Pulmonary:      Effort: Pulmonary effort is normal  No respiratory distress  Breath sounds: Normal breath sounds  Chest:      Chest wall: No tenderness  Abdominal:      General: Bowel sounds are normal       Palpations: Abdomen is soft  Tenderness: There is no abdominal tenderness  Musculoskeletal:      Cervical back: Normal range of motion        Right lower leg: No edema  Left lower leg: No edema  Skin:     General: Skin is warm and dry  Capillary Refill: Capillary refill takes less than 2 seconds  Neurological:      General: No focal deficit present  Mental Status: He is alert and oriented to person, place, and time     Psychiatric:         Behavior: Behavior normal          Vital Signs  ED Triage Vitals   Temperature Pulse Respirations Blood Pressure SpO2   05/31/23 1449 05/31/23 1449 05/31/23 1449 05/31/23 1449 05/31/23 1449   98 °F (36 7 °C) 64 16 102/70 98 %      Temp src Heart Rate Source Patient Position - Orthostatic VS BP Location FiO2 (%)   -- 05/31/23 1530 05/31/23 1449 05/31/23 1449 --    Monitor Lying Right arm       Pain Score       05/31/23 1530       No Pain           Vitals:    05/31/23 1449 05/31/23 1530 05/31/23 1600 05/31/23 1700   BP: 102/70 112/68 106/69 109/68   Pulse: 64 64 55 (!) 54   Patient Position - Orthostatic VS: Lying Lying Lying          Visual Acuity      ED Medications  Medications   sodium chloride 0 9 % bolus 1,000 mL (0 mL Intravenous Stopped 5/31/23 1603)       Diagnostic Studies  Results Reviewed     Procedure Component Value Units Date/Time    HS Troponin I 2hr [[id number]]  (Normal) Collected: 05/31/23 1619    Lab Status: Final result Specimen: Blood from Hand, Right Updated: 05/31/23 1651     hs TnI 2hr 5 ng/L      Delta 2hr hsTnI -1 ng/L     Comprehensive metabolic panel [[id number]] Collected: 05/31/23 1441    Lab Status: Final result Specimen: Blood Updated: 05/31/23 1556     Sodium 138 mmol/L      Potassium 4 1 mmol/L      Chloride 105 mmol/L      CO2 25 mmol/L      ANION GAP 8 mmol/L      BUN 9 mg/dL      Creatinine 1 04 mg/dL      Glucose 111 mg/dL      Calcium 9 1 mg/dL      AST 16 U/L      ALT 15 U/L      Alkaline Phosphatase 51 U/L      Total Protein 7 1 g/dL      Albumin 4 6 g/dL      Total Bilirubin 0 94 mg/dL      eGFR 80 ml/min/1 73sq m     Narrative:      [place] guidelines for Chronic Kidney Disease (CKD):   •  Stage 1 with normal or high GFR (GFR > 90 mL/min/1 73 square meters)  •  Stage 2 Mild CKD (GFR = 60-89 mL/min/1 73 square meters)  •  Stage 3A Moderate CKD (GFR = 45-59 mL/min/1 73 square meters)  •  Stage 3B Moderate CKD (GFR = 30-44 mL/min/1 73 square meters)  •  Stage 4 Severe CKD (GFR = 15-29 mL/min/1 73 square meters)  •  Stage 5 End Stage CKD (GFR <15 mL/min/1 73 square meters)  Note: GFR calculation is accurate only with a steady state creatinine    Magnesium [[id number]]  (Normal) Collected: 05/31/23 1441    Lab Status: Final result Specimen: Blood Updated: 05/31/23 1556     Magnesium 2 0 mg/dL     TSH [[id number]]  (Normal) Collected: 05/31/23 1441    Lab Status: Final result Specimen: Blood Updated: 05/31/23 1556     TSH 3RD GENERATON 1 427 uIU/mL     UA w Reflex to Microscopic w Reflex to Culture [[id number]] Collected: 05/31/23 1532    Lab Status: Final result Specimen: Urine, Clean Catch Updated: 05/31/23 1553     Color, UA Yellow     Clarity, UA Clear     Specific Gravity, UA <=1 005     pH, UA 5 5     Leukocytes, UA Negative     Nitrite, UA Negative     Protein, UA Negative mg/dl      Glucose, UA Negative mg/dl      Ketones, UA Negative mg/dl      Urobilinogen, UA 0 2 E U /dl      Bilirubin, UA Negative     Occult Blood, UA Negative    HS Troponin 0hr (reflex protocol) [[id number]]  (Normal) Collected: 05/31/23 1441    Lab Status: Final result Specimen: Blood Updated: 05/31/23 1520     hs TnI 0hr 6 ng/L     Lactic acid, plasma (w/reflex if result > 2 0) [[id number]]  (Normal) Collected: 05/31/23 1441    Lab Status: Final result Specimen: Blood Updated: 05/31/23 1511     LACTIC ACID 1 4 mmol/L     Narrative:      Result may be elevated if tourniquet was used during collection      Della Barnes [[id number]]  (Normal) Collected: 05/31/23 1441    Lab Status: Final result Specimen: Blood Updated: 05/31/23 1508     Protime 12 5 seconds      INR 0 92    APTT [[id number]]  (Normal) Collected: 05/31/23 1441    Lab Status: Final result Specimen: Blood Updated: 05/31/23 1508     PTT 28 seconds     CBC and differential [[id number]] Collected: 05/31/23 1441    Lab Status: Final result Specimen: Blood Updated: 05/31/23 1445     WBC 8 64 Thousand/uL      RBC 5 45 Million/uL      Hemoglobin 16 4 g/dL      Hematocrit 47 5 %      MCV 87 fL      MCH 30 1 pg      MCHC 34 5 g/dL      RDW 12 4 %      MPV 10 6 fL      Platelets 118 Thousands/uL      nRBC 0 /100 WBCs      Neutrophils Relative 62 %      Immat GRANS % 0 %      Lymphocytes Relative 29 %      Monocytes Relative 7 %      Eosinophils Relative 1 %      Basophils Relative 1 %      Neutrophils Absolute 5 39 Thousands/µL      Immature Grans Absolute 0 03 Thousand/uL      Lymphocytes Absolute 2 47 Thousands/µL      Monocytes Absolute 0 57 Thousand/µL      Eosinophils Absolute 0 11 Thousand/µL      Basophils Absolute 0 07 Thousands/µL                  XR chest 1 view portable   ED Interpretation by Edmundo Mendez PA-C (05/31 1642)   NAD                 Procedures  ECG 12 Lead Documentation Only    Date/Time: 5/31/2023 4:04 PM    Performed by: Edmundo Mendez PA-C  Authorized by: Edmundo Mendez PA-C    Indications / Diagnosis:  Palpitations  ECG reviewed by me, the ED Provider: yes    Patient location:  ED  Previous ECG:     Previous ECG:  Unavailable    Comparison to cardiac monitor: Yes    Interpretation:     Interpretation: normal    Rate:     ECG rate:  62  Rhythm:     Rhythm: sinus rhythm               ED Course                               SBIRT 20yo+    Flowsheet Row Most Recent Value   Initial Alcohol Screen: US AUDIT-C     1  How often do you have a drink containing alcohol? 0 Filed at: 05/31/2023 1413   2  How many drinks containing alcohol do you have on a typical day you are drinking? 0 Filed at: 05/31/2023 1413   3a  Male UNDER 65: How often do you have five or more drinks on one occasion?  0 Filed at: 05/31/2023 1413 Audit-C Score 0 Filed at: 05/31/2023 1415   JOSAFAT: How many times in the past year have you    Used an illegal drug or used a prescription medication for non-medical reasons? Never Filed at: 05/31/2023 3697                    Medical Decision Making  Patient is a 51-year-old male with a past medical history of SVT, A-fib, presents emerged department today with a chief complaint of palpitations  The patient states that over the weekend while mowing grass outside he had his Apple Watch going off stating that he was in A-fib  Patient did have an ablation approximately a month ago  Follows with cardiology through [facility]  Patient states that he does  state that he feels like a skipped beat and some heart racing  The patient states that he is not having any shortness of breath, dizziness, pain, nausea vomiting abdominal pain diarrhea recent medication adjustments, falls or traumas, headache blurred vision  The patient had an ablation performed for A-fib  Has had SVT in the past   States that he has started a new job and is not drinking as much water but drinking more coffee now  Is on Eliquis twice daily 5 mg  It is asymptomatic upon arrival   EKG in route was concerning for A-fib at rate of 84  To be going between A-fib and sinus rhythm  Differential Diagnosis included but was not limited to : Pneumonia, ACS, SVT, A-fib,dehydration, hyperthyroidism        Upon physical examination the patient is nontoxic-appearing, no acute distress, patient is on cardiac monitoring sinus rhythm  Patient has no acute findings on physical examination  EKG was consistent with sinus rhythm no acute ischemic findings on EKG rate of 88     upon evaluation of previous EKG in route by EMS it seems as though the patient had episodes of A-fib which then converted to normal sinus rhythm    The patient on cardiac monitoring was performed in the emergency department and was rhythm rate of 50s to 60s blood pressure of 106/69  he was asymptomatic  Patient work-up unremarkable for any acute changes  Patient troponin x2 negative  Thyroid normal   X-ray without any acute findings  Patient possibly provoked episodes due to increasing caffeine use and decreasing hydration  Educated to decrease caffeine use  Educated to follow up with his cardiologist about the episodes he was experiencing  Patient expressed understanding was agreement treatment plan  Amount and/or Complexity of Data Reviewed  Labs: ordered  Decision-making details documented in ED Course  Radiology: ordered and independent interpretation performed  Decision-making details documented in ED Course  ECG/medicine tests: ordered  Decision-making details documented in ED Course  Disposition  Final diagnoses:   Palpitations     Time reflects when diagnosis was documented in both MDM as applicable and the Disposition within this note     Time User Action Codes Description Comment    5/31/2023  4:43 PM Bronson Partida Add [R00 2] Palpitations       ED Disposition     ED Disposition   Discharge    Condition   Stable    Date/Time   Wed May 31, 2023  4:43 PM    Comment   Fernando Fox  discharge to home/self care  Follow-up Information    None         Discharge Medication List as of 5/31/2023  4:43 PM      CONTINUE these medications which have NOT CHANGED    Details   apixaban (Eliquis) 5 mg Take 5 mg by mouth 2 (two) times a day, Historical Med      pantoprazole (PROTONIX) 40 mg tablet Take 40 mg by mouth daily, Starting Mon 11/23/2020, Historical Med      sucralfate (CARAFATE) 1 g tablet Take 1 g by mouth 3 (three) times a day, Historical Med             No discharge procedures on file      PDMP Review     None          ED Provider  Electronically Signed by           Stevenson Patiño PA-C  05/31/23 0485

## 2023-06-02 LAB
ATRIAL RATE: 62 BPM
P AXIS: 28 DEGREES
PR INTERVAL: 156 MS
QRS AXIS: -32 DEGREES
QRSD INTERVAL: 88 MS
QT INTERVAL: 388 MS
QTC INTERVAL: 393 MS
T WAVE AXIS: -5 DEGREES
VENTRICULAR RATE: 62 BPM

## 2023-07-06 ENCOUNTER — DOCTOR'S OFFICE (OUTPATIENT)
Dept: URBAN - NONMETROPOLITAN AREA CLINIC 1 | Facility: CLINIC | Age: 58
Setting detail: OPHTHALMOLOGY
End: 2023-07-06
Payer: COMMERCIAL

## 2023-07-06 ENCOUNTER — RX ONLY (RX ONLY)
Age: 58
End: 2023-07-06

## 2023-07-06 VITALS — HEIGHT: 60 IN

## 2023-07-06 DIAGNOSIS — H52.223: ICD-10-CM

## 2023-07-06 DIAGNOSIS — H52.03: ICD-10-CM

## 2023-07-06 DIAGNOSIS — Z01.00: ICD-10-CM

## 2023-07-06 DIAGNOSIS — H52.4: ICD-10-CM

## 2023-07-06 PROBLEM — H01.004 BLEPHARITIS; RIGHT UPPER LID, LEFT UPPER LID: Status: ACTIVE | Noted: 2023-07-06

## 2023-07-06 PROBLEM — H01.001 BLEPHARITIS; RIGHT UPPER LID, LEFT UPPER LID: Status: ACTIVE | Noted: 2023-07-06

## 2023-07-06 PROBLEM — H04.123 DRY EYE SYNDROME LACRIMAL GLAND; BOTH EYES: Status: ACTIVE | Noted: 2023-07-06

## 2023-07-06 PROCEDURE — 92015 DETERMINE REFRACTIVE STATE: CPT

## 2023-07-06 PROCEDURE — 92014 COMPRE OPH EXAM EST PT 1/>: CPT

## 2023-07-06 ASSESSMENT — REFRACTION_AUTOREFRACTION
OD_SPHERE: +2.25
OD_CYLINDER: -2.00
OS_AXIS: 086
OS_CYLINDER: -1.00
OD_AXIS: 108
OS_SPHERE: +2.00

## 2023-07-06 ASSESSMENT — REFRACTION_MANIFEST
OD_ADD: +2.50
OS_ADD: +2.50
OS_SPHERE: +1.00
OS_CYLINDER: -0.75
OS_VA2: 20/20
OD_VA1: 20/20
OU_VA: 20/20
OS_AXIS: 085
OS_VA1: 20/20
OD_CYLINDER: -0.75
OD_VA2: 20/20
OD_AXIS: 110
OD_SPHERE: +1.00

## 2023-07-06 ASSESSMENT — TONOMETRY
OS_IOP_MMHG: 15
OD_IOP_MMHG: 16

## 2023-07-06 ASSESSMENT — REFRACTION_CURRENTRX
OS_AXIS: 098
OD_SPHERE: +0.75
OD_CYLINDER: -0.75
OS_OVR_VA: 20/
OD_AXIS: 083
OD_VPRISM_DIRECTION: SV
OD_OVR_VA: 20/
OS_CYLINDER: -0.50
OS_VPRISM_DIRECTION: SV
OD_ADD: +2.25
OS_ADD: +2.25
OS_SPHERE: +0.75

## 2023-07-06 ASSESSMENT — LID EXAM ASSESSMENTS
OS_BLEPHARITIS: LLL LUL T
OD_BLEPHARITIS: RLL RUL T

## 2023-07-06 ASSESSMENT — CONFRONTATIONAL VISUAL FIELD TEST (CVF)
OS_FINDINGS: FULL
OD_FINDINGS: FULL

## 2023-07-06 ASSESSMENT — TEAR BREAK UP TIME (TBUT)
OS_TBUT: 7-8 SEC
OD_TBUT: 7-8 SEC

## 2023-07-06 ASSESSMENT — SPHEQUIV_DERIVED
OD_SPHEQUIV: 0.625
OS_SPHEQUIV: 1.5
OS_SPHEQUIV: 0.625
OD_SPHEQUIV: 1.25

## 2023-07-06 ASSESSMENT — VISUAL ACUITY
OD_BCVA: 20/25
OS_BCVA: 20/25

## 2024-01-05 ENCOUNTER — OPTICAL OFFICE (OUTPATIENT)
Dept: URBAN - NONMETROPOLITAN AREA CLINIC 4 | Facility: CLINIC | Age: 59
Setting detail: OPHTHALMOLOGY
End: 2024-01-05
Payer: COMMERCIAL

## 2024-01-05 DIAGNOSIS — H52.03: ICD-10-CM

## 2024-01-05 PROCEDURE — V2203 LENS SPHCYL BIFOCAL 4.00D/.1: HCPCS

## 2024-01-05 PROCEDURE — V2784 LENS POLYCARB OR EQUAL: HCPCS

## 2024-01-05 PROCEDURE — V2750 ANTI-REFLECTIVE COATING: HCPCS | Mod: T2

## 2024-01-05 PROCEDURE — V2781 PROGRESSIVE LENS PER LENS: HCPCS | Mod: LT

## 2024-01-05 PROCEDURE — V2020 VISION SVCS FRAMES PURCHASES: HCPCS

## 2024-01-05 PROCEDURE — V2025 EYEGLASSES DELUX FRAMES: HCPCS

## 2024-01-05 PROCEDURE — V2750 ANTI-REFLECTIVE COATING: HCPCS | Mod: LT

## 2024-01-05 PROCEDURE — V2784 LENS POLYCARB OR EQUAL: HCPCS | Mod: LT

## 2024-01-05 PROCEDURE — V2781 PROGRESSIVE LENS PER LENS: HCPCS | Mod: T3
